# Patient Record
Sex: FEMALE | Race: WHITE
[De-identification: names, ages, dates, MRNs, and addresses within clinical notes are randomized per-mention and may not be internally consistent; named-entity substitution may affect disease eponyms.]

---

## 2017-04-30 ENCOUNTER — HOSPITAL ENCOUNTER (EMERGENCY)
Dept: HOSPITAL 10 - E/R | Age: 57
Discharge: HOME | End: 2017-04-30
Payer: MEDICAID

## 2017-04-30 VITALS — SYSTOLIC BLOOD PRESSURE: 132 MMHG | DIASTOLIC BLOOD PRESSURE: 71 MMHG | HEART RATE: 82 BPM | RESPIRATION RATE: 16 BRPM

## 2017-04-30 VITALS
HEIGHT: 62 IN | HEIGHT: 62 IN | WEIGHT: 160.94 LBS | WEIGHT: 160.94 LBS | BODY MASS INDEX: 29.62 KG/M2 | BODY MASS INDEX: 29.62 KG/M2

## 2017-04-30 VITALS — TEMPERATURE: 97.9 F

## 2017-04-30 DIAGNOSIS — K74.60: Primary | ICD-10-CM

## 2017-04-30 LAB
ADD SCAN DIFF: NO
ADD UMIC: NO
ALBUMIN SERPL-MCNC: 3.5 G/DL (ref 3.3–4.9)
ALBUMIN/GLOB SERPL: 0.94 {RATIO}
ALP SERPL-CCNC: 224 IU/L (ref 42–121)
ALT SERPL-CCNC: 32 IU/L (ref 13–69)
ANION GAP SERPL CALC-SCNC: 16 MMOL/L (ref 8–16)
APTT BLD: 31.5 SEC (ref 25–35)
AST SERPL-CCNC: 39 IU/L (ref 15–46)
BASOPHILS # BLD AUTO: 0 10^3/UL (ref 0–0.1)
BASOPHILS NFR BLD: 0.3 % (ref 0–2)
BILIRUB DIRECT SERPL-MCNC: 0 MG/DL (ref 0–0.2)
BILIRUB SERPL-MCNC: 0.7 MG/DL (ref 0.2–1.3)
BUN SERPL-MCNC: 16 MG/DL (ref 7–20)
CALCIUM SERPL-MCNC: 8.5 MG/DL (ref 8.4–10.2)
CHLORIDE SERPL-SCNC: 109 MMOL/L (ref 97–110)
CO2 SERPL-SCNC: 21 MMOL/L (ref 21–31)
COLOR UR: YELLOW
CREAT SERPL-MCNC: 0.53 MG/DL (ref 0.44–1)
EOSINOPHIL # BLD: 0.2 10^3/UL (ref 0–0.5)
EOSINOPHIL NFR BLD: 2.2 % (ref 0–7)
ERYTHROCYTE [DISTWIDTH] IN BLOOD BY AUTOMATED COUNT: 20.7 % (ref 11.5–14.5)
GLOBULIN SER-MCNC: 3.7 G/DL (ref 1.3–3.2)
GLUCOSE SERPL-MCNC: 148 MG/DL (ref 70–220)
GLUCOSE UR STRIP-MCNC: NEGATIVE %
HCT VFR BLD CALC: 33 % (ref 37–47)
HGB BLD-MCNC: 10.1 G/DL (ref 12–16)
INR PPP: 1.21
KETONES UR STRIP.AUTO-MCNC: (no result) MG/DL
LYMPHOCYTES # BLD AUTO: 1.3 10^3/UL (ref 0.8–2.9)
LYMPHOCYTES NFR BLD AUTO: 13 % (ref 15–51)
MCH RBC QN AUTO: 25.8 PG (ref 29–33)
MCHC RBC AUTO-ENTMCNC: 30.6 G/DL (ref 32–37)
MCV RBC AUTO: 84.4 FL (ref 82–101)
MONOCYTES # BLD: 0.6 10^3/UL (ref 0.3–0.9)
MONOCYTES NFR BLD: 5.5 % (ref 0–11)
NEUTROPHILS # BLD: 7.8 10^3/UL (ref 1.6–7.5)
NEUTROPHILS NFR BLD AUTO: 78.6 % (ref 39–77)
NITRITE UR QL STRIP.AUTO: NEGATIVE
NRBC # BLD MANUAL: 0 10^3/UL (ref 0–0)
NRBC BLD QL: 0 /100WBC (ref 0–0)
PLATELET # BLD: 124 10^3/UL (ref 140–415)
PMV BLD AUTO: 9.9 FL (ref 7.4–10.4)
POTASSIUM SERPL-SCNC: 3.3 MMOL/L (ref 3.5–5.1)
PROT SERPL-MCNC: 7.2 G/DL (ref 6.1–8.1)
PROTHROMBIN TIME: 15.4 SEC (ref 12.2–14.2)
PT RATIO: 1.2
RBC # BLD AUTO: 3.91 10^6/UL (ref 4.2–5.4)
RBC # UR AUTO: NEGATIVE /UL
SODIUM SERPL-SCNC: 143 MMOL/L (ref 135–144)
TROPONIN I SERPL-MCNC: < 0.012 NG/ML (ref 0–0.12)
URINE BILIRUBIN (DIP): NEGATIVE
URINE TOTAL PROTEIN (DIP): NEGATIVE
UROBILINOGEN UR STRIP-ACNC: (no result) (ref 0.1–1)
WBC # BLD AUTO: 9.9 10^3/UL (ref 4.8–10.8)
WBC # UR STRIP: NEGATIVE /UL

## 2017-04-30 PROCEDURE — 85025 COMPLETE CBC W/AUTO DIFF WBC: CPT

## 2017-04-30 PROCEDURE — 36415 COLL VENOUS BLD VENIPUNCTURE: CPT

## 2017-04-30 PROCEDURE — 96375 TX/PRO/DX INJ NEW DRUG ADDON: CPT

## 2017-04-30 PROCEDURE — 85730 THROMBOPLASTIN TIME PARTIAL: CPT

## 2017-04-30 PROCEDURE — 71010: CPT

## 2017-04-30 PROCEDURE — 81003 URINALYSIS AUTO W/O SCOPE: CPT

## 2017-04-30 PROCEDURE — 74176 CT ABD & PELVIS W/O CONTRAST: CPT

## 2017-04-30 PROCEDURE — 84484 ASSAY OF TROPONIN QUANT: CPT

## 2017-04-30 PROCEDURE — 93005 ELECTROCARDIOGRAM TRACING: CPT

## 2017-04-30 PROCEDURE — 85610 PROTHROMBIN TIME: CPT

## 2017-04-30 PROCEDURE — 80053 COMPREHEN METABOLIC PANEL: CPT

## 2017-04-30 PROCEDURE — 83690 ASSAY OF LIPASE: CPT

## 2017-04-30 PROCEDURE — 96374 THER/PROPH/DIAG INJ IV PUSH: CPT

## 2017-04-30 NOTE — RADRPT
PROCEDURE:   CT Abdomen and pelvis without contrast. 

 

CLINICAL INDICATION:   Abdominal pain. 

 

TECHNIQUE:    CT scan of the abdomen and pelvis was performed on a multi-detector high-resolution CT
 scanner.  Contiguous axial images were obtained from the lung bases to the ischial tuberosities wit
hout intravenous contrast.  Coronal and sagittal reformatted images were also obtained.  Images were
 reviewed on the PACS workstation.

 

One or more of the following dose reduction techniques were used:

- Automated exposure control.

- Adjustment of the mA and/or kV according to patient size.

- Use of iterative reconstruction technique.

 

Exam CTD/vol = 16.51 mGy.

Total exam DLP = 876.06 mGy-cm.   

 

COMPARISON:   None. 

 

FINDINGS:

Evaluation of the lung bases demonstrates no pleural or parenchymal disease.

 

Abdomen:  The liver is normal in size with a diffuse nodular contour consistent with cirrhosis.  The
re is no focal mass or dilatation of the biliary tree.  The patient is status post cholecystectomy. 
 The spleen, pancreas and bilateral adrenal glands are within normal limits.  Bilateral kidneys are 
normal in size with no contour deforming mass identified.  There is no radiopaque renal or ureteral 
calculus identified.  There is no hydronephrosis or hydroureter.  There is no retroperitoneal adenop
athy.  The abdominal aorta is of normal caliber with mild scattered atherosclerotic calcifications.

 

There is no abnormal bowel wall thickening or distension.  There is no bowel obstruction or free air
.  A normal appendix is identified.  There is no diverticulosis or diverticulitis.  There is no asci
bernabe.

 

Pelvis:  The bladder is unremarkable.  The uterus and adnexa are within normal limits.  There is tra
ce pelvic free fluid.  There is no significant pelvic adenopathy.

 

Evaluation of the osseous structures demonstrates no suspicious lytic or blastic lesion. There is in
crease sclerosis surrounding bilateral sacroiliac joints.

 

IMPRESSION:

Cirrhotic liver.

Status post cholecystectomy.

Mild vascular calcifications reflective of atherosclerosis.

Trace pelvic free fluid.

Bilateral sacroiliitis.

Otherwise no acute abnormality identified within the abdomen and pelvis.

_____________________________________________ 

.Howard Gilbert MD, MD           Date    Time 

Electronically viewed and signed by .Howard Gilbert MD, MD on 04/30/2017 03:33 

 

D:  04/30/2017 03:33  T:  04/30/2017 03:33

.T/

## 2017-04-30 NOTE — RADRPT
PROCEDURE:   XR Chest. 

 

CLINICAL INDICATION:  Abdominal pain.

 

TECHNIQUE:  Single frontal view of the chest.

 

COMPARISON:  Plain film chest dated 03/30/2014.

 

FINDINGS:

Cardiomegaly and atherosclerotic calcifications in the thoracic aorta.  Portable technique, patient 
body habitus and hypoinflated lungs accentuate pulmonary vascular markings. The lungs are otherwise 
clear. No signs of pleural fluid or pneumothorax are seen. The osseous structures and soft tissues a
re unremarkable. 

 

IMPRESSION:

No evidence for active cardiopulmonary disease. 

 

RPTAT: UU

_____________________________________________ 

Physician Nicola           Date    Time 

Electronically viewed and signed by Physician Nicola on 04/30/2017 03:22 

 

D:  04/30/2017 03:22  T:  04/30/2017 03:22

RS/

## 2017-04-30 NOTE — ERD
ER Documentation


Chief Complaint


Date/Time


DATE: 4/30/17 


TIME: 05:03


Chief Complaint


RUQ abd pain radaiting to back since 4 hours ago





HPI


This 56-year-old female presents for right upper quadrant pain radiating to her 

back for 4 hours.  She is also had nausea and vomiting.  The vomiting is 

nonbloody and nonbilious.  She has had no fevers and chills.  She gets this 

every now and then.  Is not related to food intake as far she knows.  She has 

had her gallbladder removed years ago.  She has no chest pain shortness of 

breath





ROS


All systems reviewed and are negative except as per history of present illness.





Medications


Home Meds


Active Scripts


Ranitidine Hcl* (Zantac*) 150 Mg Tablet, 150 MG PO BID Y for EPIGASTRIC PAIN, #

30 TAB


   Prov:CHARLEEN NUNN DO         4/30/17


Ondansetron (Zofran Odt) 4 Mg Tab.rapdis, 4 MG PO Q6, #20


   Prov:CHARLEEN NUNN DO         4/30/17


Naproxen* (Naproxen*) 500 Mg Tablet, 500 MG PO BID, #20 TAB


   Prov:CHARLEEN NUNN DO         4/30/17


Hydrocodone/Acetaminophen (Norco 5-325 Tablet) 1 Each Tablet, 1 EACH PO Q6, #14 

TAB


   Prov:CHARLEEN NUNN DO         4/30/17


Benzonatate* (Tessalon Perle*) 100 Mg Capsule, 100 MG PO TID, #30 CAP


   Prov:JOSHUA GALLEGO PA-C         3/28/17


Dextromethorphan Hb-Promethazine Hcl (Promethazine DM Syrup) 473 Ml Syrup, 5 ML 

PO Q6H Y for COUGH, #4 OZ


   Prov:JOSHUA GALLEGO PA-C         3/28/17


Ondansetron (Ondansetron Odt) 4 Mg Tab.rapdis, 4 MG PO Q6H Y for NAUSEA AND/OR 

VOMITING, #30 TAB


   Prov:NOBLE ALMAGUER MD         2/22/17


Hydrocodone/Acetaminophen (Norco  Tablet) 1 Each Tablet, 1 TAB PO Q6H Y 

for PAIN, #7 TAB


   Prov:NOBLE ALMAGUER MD         2/22/17


Nitrofurantoin Monohyd Macrocr* (Macrobid*) 100 Mg Capsr, 100 MG PO BID for 7 

Days, CAP


   Prov:NOBLE ALMAGUER MD         2/22/17





Allergies


Allergies:  


Coded Allergies:  


     No Known Allergy (Unverified , 12/5/13)





PMhx/Soc


History of Surgery:  Yes (Csection, gallbladder)


Anesthesia Reaction:  No


Hx Neurological Disorder:  No


Hx Respiratory Disorders:  No


Hx Cardiac Disorders:  No


Hx Psychiatric Problems:  No


Hx Miscellaneous Medical Probl:  Yes (depression)


Hx Alcohol Use:  No


Hx Substance Use:  No


Hx Tobacco Use:  No


Smoking Status:  Never smoker





Physical Exam


Vitals





Vital Signs








  Date Time  Temp Pulse Resp B/P Pulse Ox O2 Delivery O2 Flow Rate FiO2


 


4/30/17 01:42 97.9 86 21 128/84 100 Room Air  


 


4/30/17 00:29 98.5 102 20 165/80 100   








Physical Exam


Const:  []


Head:   Atraumatic 


Eyes:    Normal Conjunctiva


ENT:    Normal External Ears, Nose and Mouth.


Neck:               Full range of motion..~ No meningismus.


Resp:    Clear to auscultation bilaterally


Cardio:    Regular rate and rhythm, no murmurs


Abd:    Soft, non tender, non distended. Normal bowel sounds


Skin:    No petechiae or rashes


Back:    No midline or flank tenderness


Ext:    No cyanosis, or edema


Neur:    Awake and alert


Psych:    Normal Mood and Affect


Result Diagram:  


4/30/17 0150                                                                   

             4/30/17 0150





Results 24 hrs





 Laboratory Tests








Test


  4/30/17


01:50 4/30/17


04:17


 


White Blood Count 9.910^3/ul  


 


Red Blood Count 3.9110^6/ul  


 


Hemoglobin 10.1g/dl  


 


Hematocrit 33.0%  


 


Mean Corpuscular Volume 84.4fl  


 


Mean Corpuscular Hemoglobin 25.8pg  


 


Mean Corpuscular Hemoglobin


Concent 30.6g/dl 


  


 


 


Red Cell Distribution Width 20.7%  


 


Platelet Count 12494^3/UL  


 


Mean Platelet Volume 9.9fl  


 


Neutrophils % 78.6%  


 


Lymphocytes % 13.0%  


 


Monocytes % 5.5%  


 


Eosinophils % 2.2%  


 


Basophils % 0.3%  


 


Nucleated Red Blood Cells % 0.0/100WBC  


 


Neutrophils # 7.810^3/ul  


 


Lymphocytes # 1.310^3/ul  


 


Monocytes # 0.610^3/ul  


 


Eosinophils # 0.210^3/ul  


 


Basophils # 0.010^3/ul  


 


Nucleated Red Blood Cells # 0.010^3/ul  


 


Prothrombin Time 15.4Sec  


 


Prothrombin Time Ratio 1.2  


 


INR International Normalized


Ratio 1.21 


  


 


 


Activated Partial


Thromboplast Time 31.5Sec 


  


 


 


Sodium Level 143mmol/L  


 


Potassium Level 3.3mmol/L  


 


Chloride Level 109mmol/L  


 


Carbon Dioxide Level 21mmol/L  


 


Anion Gap 16  


 


Blood Urea Nitrogen 16mg/dl  


 


Creatinine 0.53mg/dl  


 


Glucose Level 148mg/dl  


 


Calcium Level 8.5mg/dl  


 


Total Bilirubin 0.7mg/dl  


 


Direct Bilirubin 0.00mg/dl  


 


Indirect Bilirubin 0.7mg/dl  


 


Aspartate Amino Transf


(AST/SGOT) 39IU/L 


  


 


 


Alanine Aminotransferase


(ALT/SGPT) 32IU/L 


  


 


 


Alkaline Phosphatase 224IU/L  


 


Troponin I < 0.012ng/ml  


 


Total Protein 7.2g/dl  


 


Albumin 3.5g/dl  


 


Globulin 3.70g/dl  


 


Albumin/Globulin Ratio 0.94  


 


Lipase 153U/L  


 


Urine Color  YELLOW 


 


Urine Clarity  CLEAR 


 


Urine pH  6.5 


 


Urine Specific Gravity  1.025 


 


Urine Ketones  TRACE 


 


Urine Nitrite  NEGATIVE 


 


Urine Bilirubin  NEGATIVE 


 


Urine Urobilinogen  0.2  E.U./dL 


 


Urine Leukocyte Esterase  NEGATIVE 


 


Urine Hemoglobin  NEGATIVE 


 


Urine Glucose  NEGATIVE% 


 


Urine Total Protein  NEGATIVE 








 Current Medications








 Medications


  (Trade)  Dose


 Ordered  Sig/Emily


 Route


 PRN Reason  Start Time


 Stop Time Status Last Admin


Dose Admin


 


 Morphine Sulfate


  (morphine)  4 mg  ONCE  STAT


 IV


   4/30/17 01:39


 4/30/17 01:42 DC 4/30/17 02:07


 


 


 Ondansetron HCl


  (Zofran Inj)  4 mg  ONCE  STAT


 IV


   4/30/17 01:39


 4/30/17 01:42 DC 4/30/17 02:07


 


 


 Famotidine


  (Pepcid Iv)  20 mg  ONCE  ONCE


 IV


   4/30/17 02:00


 4/30/17 02:01 DC 4/30/17 02:07


 











Procedures/MDM


Right upper quadrant pain with cirrhotic liver the patient did not know she 

had.  She has primary care follow-up but had never been told that she has liver 

problems.  She does not have any elevated liver enzymes enzymes.  Her pain was 

improved greatly in the emergency room with morphine and Pepcid.  Her nausea 

was completely resolved with Zofran.  She has no signs of infection.  No signs 

of cardiac ischemia are elevated troponin.  I am going to discharge her primary 

care follow-up as well as instructions to see a gastroenterologist for her 

liver cirrhosis as well as a possible EGD to rule out ulcer.  No free air.  

Return precautions to the ER.





CT abdomen pelvis interpretation: Cirrhotic liver, no obstruction, no free air, 

no fractures.


EKG interpretation: Normal sinus rhythm rate of 80, prolonged QT, normal axis, 

no ST or T-wave changes concerning for acute ischemia





Departure


Diagnosis:  


 Primary Impression:  


 Cirrhosis of liver


 Additional Impression:  


 Abdominal pain


Condition:  Stable


Patient Instructions:  Abdominal Pain, Cirrhosis of the Liver





Additional Instructions:  


Llame al doctor FE y andreea vandana RAMAN PARA DENTRO DE 1-2 JONES. Consigue un 

referral para un GASTROENTEROLOGIST for liver and possible EGD.  Dgale a la 

secretaria que nosotros le instruimos hacer esta raman.Avise o llame si bunch 

condicin se empeora antes de la raman. Regresa aqui si peor o no mejor.











CHARLEEN NUNN DO Apr 30, 2017 05:06

## 2018-06-01 ENCOUNTER — HOSPITAL ENCOUNTER (INPATIENT)
Dept: HOSPITAL 91 - E/R | Age: 58
LOS: 4 days | Discharge: HOME | DRG: 444 | End: 2018-06-05
Payer: MEDICAID

## 2018-06-01 DIAGNOSIS — K29.70: ICD-10-CM

## 2018-06-01 DIAGNOSIS — Z90.49: ICD-10-CM

## 2018-06-01 DIAGNOSIS — F41.9: ICD-10-CM

## 2018-06-01 DIAGNOSIS — K74.60: ICD-10-CM

## 2018-06-01 DIAGNOSIS — K80.50: Primary | ICD-10-CM

## 2018-06-01 DIAGNOSIS — R17: ICD-10-CM

## 2018-06-01 DIAGNOSIS — K70.30: ICD-10-CM

## 2018-06-01 DIAGNOSIS — K83.1: ICD-10-CM

## 2018-06-01 LAB
ABNORMAL IP MESSAGE: 1
ADD MAN DIFF?: NO
ALANINE AMINOTRANSFERASE: 27 IU/L (ref 13–69)
ALBUMIN/GLOBULIN RATIO: 0.97
ALBUMIN: 3.7 G/DL (ref 3.3–4.9)
ALKALINE PHOSPHATASE: 223 IU/L (ref 42–121)
ANION GAP: 14 (ref 8–16)
ASPARTATE AMINO TRANSFERASE: 51 IU/L (ref 15–46)
BASOPHIL #: 0 10^3/UL (ref 0–0.1)
BASOPHILS %: 0.6 % (ref 0–2)
BILIRUBIN,DIRECT: 0 MG/DL (ref 0–0.2)
BILIRUBIN,TOTAL: 1.6 MG/DL (ref 0.2–1.3)
BLOOD UREA NITROGEN: 4 MG/DL (ref 7–20)
CALCIUM: 8.9 MG/DL (ref 8.4–10.2)
CARBON DIOXIDE: 26 MMOL/L (ref 21–31)
CHLORIDE: 111 MMOL/L (ref 97–110)
CREATININE: 0.5 MG/DL (ref 0.44–1)
EOSINOPHILS #: 0.4 10^3/UL (ref 0–0.5)
EOSINOPHILS %: 6.4 % (ref 0–7)
GLOBULIN: 3.8 G/DL (ref 1.3–3.2)
GLUCOSE: 110 MG/DL (ref 70–220)
HEMATOCRIT: 39.7 % (ref 37–47)
HEMOGLOBIN: 13.7 G/DL (ref 12–16)
INR: 1.15
LIPASE: 132 U/L (ref 23–300)
LYMPHOCYTES #: 2.1 10^3/UL (ref 0.8–2.9)
LYMPHOCYTES %: 37.9 % (ref 15–51)
MEAN CORPUSCULAR HEMOGLOBIN: 33 PG (ref 29–33)
MEAN CORPUSCULAR HGB CONC: 34.5 G/DL (ref 32–37)
MEAN CORPUSCULAR VOLUME: 95.7 FL (ref 82–101)
MEAN PLATELET VOLUME: 11.8 FL (ref 7.4–10.4)
MONOCYTE #: 0.6 10^3/UL (ref 0.3–0.9)
MONOCYTES %: 11 % (ref 0–11)
NEUTROPHIL #: 2.4 10^3/UL (ref 1.6–7.5)
NEUTROPHILS %: 43.9 % (ref 39–77)
NUCLEATED RED BLOOD CELLS #: 0 10^3/UL (ref 0–0)
NUCLEATED RED BLOOD CELLS%: 0 /100WBC (ref 0–0)
PARTIAL THROMBOPLASTIN TIME: 40.8 SEC (ref 25–35)
PLATELET COUNT: 72 10^3/UL (ref 140–415)
POSITIVE DIFF: (no result)
POTASSIUM: 3.9 MMOL/L (ref 3.5–5.1)
PROTIME: 14.9 SEC (ref 11.9–14.9)
PT RATIO: 1.2
RED BLOOD COUNT: 4.15 10^6/UL (ref 4.2–5.4)
RED CELL DISTRIBUTION WIDTH: 13.9 % (ref 11.5–14.5)
SODIUM: 147 MMOL/L (ref 135–144)
TOTAL PROTEIN: 7.5 G/DL (ref 6.1–8.1)
TROPONIN-I: < 0.012 NG/ML (ref 0–0.12)
WHITE BLOOD COUNT: 5.4 10^3/UL (ref 4.8–10.8)

## 2018-06-01 PROCEDURE — 74181 MRI ABDOMEN W/O CONTRAST: CPT

## 2018-06-01 PROCEDURE — 88312 SPECIAL STAINS GROUP 1: CPT

## 2018-06-01 PROCEDURE — 86301 IMMUNOASSAY TUMOR CA 19-9: CPT

## 2018-06-01 PROCEDURE — 88305 TISSUE EXAM BY PATHOLOGIST: CPT

## 2018-06-01 PROCEDURE — 96375 TX/PRO/DX INJ NEW DRUG ADDON: CPT

## 2018-06-01 PROCEDURE — 85730 THROMBOPLASTIN TIME PARTIAL: CPT

## 2018-06-01 PROCEDURE — 88104 CYTOPATH FL NONGYN SMEARS: CPT

## 2018-06-01 PROCEDURE — 83036 HEMOGLOBIN GLYCOSYLATED A1C: CPT

## 2018-06-01 PROCEDURE — 83735 ASSAY OF MAGNESIUM: CPT

## 2018-06-01 PROCEDURE — 80053 COMPREHEN METABOLIC PANEL: CPT

## 2018-06-01 PROCEDURE — 99285 EMERGENCY DEPT VISIT HI MDM: CPT

## 2018-06-01 PROCEDURE — 74330 X-RAY BILE/PANC ENDOSCOPY: CPT

## 2018-06-01 PROCEDURE — 84484 ASSAY OF TROPONIN QUANT: CPT

## 2018-06-01 PROCEDURE — 84443 ASSAY THYROID STIM HORMONE: CPT

## 2018-06-01 PROCEDURE — 96374 THER/PROPH/DIAG INJ IV PUSH: CPT

## 2018-06-01 PROCEDURE — 36415 COLL VENOUS BLD VENIPUNCTURE: CPT

## 2018-06-01 PROCEDURE — 74176 CT ABD & PELVIS W/O CONTRAST: CPT

## 2018-06-01 PROCEDURE — 83690 ASSAY OF LIPASE: CPT

## 2018-06-01 PROCEDURE — 93005 ELECTROCARDIOGRAM TRACING: CPT

## 2018-06-01 PROCEDURE — 80061 LIPID PANEL: CPT

## 2018-06-01 PROCEDURE — 85610 PROTHROMBIN TIME: CPT

## 2018-06-01 PROCEDURE — 85025 COMPLETE CBC W/AUTO DIFF WBC: CPT

## 2018-06-01 RX ADMIN — ONDANSETRON HYDROCHLORIDE 1 MG: 2 INJECTION, SOLUTION INTRAMUSCULAR; INTRAVENOUS at 02:41

## 2018-06-01 RX ADMIN — THIAMINE HYDROCHLORIDE 1 MLS/HR: 100 INJECTION, SOLUTION INTRAMUSCULAR; INTRAVENOUS at 02:40

## 2018-06-01 RX ADMIN — FAMOTIDINE 1 MG: 10 INJECTION, SOLUTION INTRAVENOUS at 02:46

## 2018-06-01 RX ADMIN — THIAMINE HYDROCHLORIDE 1 MLS/HR: 100 INJECTION, SOLUTION INTRAMUSCULAR; INTRAVENOUS at 04:54

## 2018-06-01 RX ADMIN — ALUMINUM HYDROXIDE, MAGNESIUM HYDROXIDE, DIMETHICONE 1 ML: 200; 200; 20 SUSPENSION ORAL at 02:46

## 2018-06-01 RX ADMIN — HYDROMORPHONE HYDROCHLORIDE 1 MG: 1 INJECTION, SOLUTION INTRAMUSCULAR; INTRAVENOUS; SUBCUTANEOUS at 06:09

## 2018-06-01 RX ADMIN — HYDROMORPHONE HYDROCHLORIDE 1 MG: 1 INJECTION, SOLUTION INTRAMUSCULAR; INTRAVENOUS; SUBCUTANEOUS at 13:58

## 2018-06-02 LAB
ABNORMAL IP MESSAGE: 1
ADD MAN DIFF?: NO
ALANINE AMINOTRANSFERASE: 31 IU/L (ref 13–69)
ALBUMIN/GLOBULIN RATIO: 0.9
ALBUMIN: 2.8 G/DL (ref 3.3–4.9)
ALKALINE PHOSPHATASE: 136 IU/L (ref 42–121)
ANION GAP: 10 (ref 8–16)
ASPARTATE AMINO TRANSFERASE: 51 IU/L (ref 15–46)
BASOPHIL #: 0 10^3/UL (ref 0–0.1)
BASOPHILS %: 0.7 % (ref 0–2)
BILIRUBIN,DIRECT: 0 MG/DL (ref 0–0.2)
BILIRUBIN,TOTAL: 1.6 MG/DL (ref 0.2–1.3)
BLOOD UREA NITROGEN: 10 MG/DL (ref 7–20)
CALCIUM: 8.2 MG/DL (ref 8.4–10.2)
CARBON DIOXIDE: 28 MMOL/L (ref 21–31)
CHLORIDE: 111 MMOL/L (ref 97–110)
CHOL/HDL RATIO: 2.5 RATIO
CHOLESTEROL: 93 MG/DL (ref 100–200)
CREATININE: 0.52 MG/DL (ref 0.44–1)
EOSINOPHILS #: 0.2 10^3/UL (ref 0–0.5)
EOSINOPHILS %: 5.5 % (ref 0–7)
GLOBULIN: 3.1 G/DL (ref 1.3–3.2)
GLUCOSE: 102 MG/DL (ref 70–220)
HDL CHOLESTEROL: 37 MG/DL (ref 37–92)
HEMATOCRIT: 34.5 % (ref 37–47)
HEMOGLOBIN A1C: 5.2 % (ref 0–5.9)
HEMOGLOBIN: 11.9 G/DL (ref 12–16)
LDL CHOLESTEROL,CALCULATED: 40 MG/DL
LYMPHOCYTES #: 1.6 10^3/UL (ref 0.8–2.9)
LYMPHOCYTES %: 38.5 % (ref 15–51)
MAGNESIUM: 2 MG/DL (ref 1.7–2.5)
MEAN CORPUSCULAR HEMOGLOBIN: 33 PG (ref 29–33)
MEAN CORPUSCULAR HGB CONC: 34.5 G/DL (ref 32–37)
MEAN CORPUSCULAR VOLUME: 95.6 FL (ref 82–101)
MEAN PLATELET VOLUME: 11.2 FL (ref 7.4–10.4)
MONOCYTE #: 0.5 10^3/UL (ref 0.3–0.9)
MONOCYTES %: 11.9 % (ref 0–11)
NEUTROPHIL #: 1.7 10^3/UL (ref 1.6–7.5)
NEUTROPHILS %: 42.9 % (ref 39–77)
NUCLEATED RED BLOOD CELLS #: 0 10^3/UL (ref 0–0)
NUCLEATED RED BLOOD CELLS%: 0 /100WBC (ref 0–0)
PLATELET COUNT: 64 10^3/UL (ref 140–415)
POSITIVE DIFF: (no result)
POTASSIUM: 4.2 MMOL/L (ref 3.5–5.1)
RED BLOOD COUNT: 3.61 10^6/UL (ref 4.2–5.4)
RED CELL DISTRIBUTION WIDTH: 14.3 % (ref 11.5–14.5)
SODIUM: 145 MMOL/L (ref 135–144)
THYROID STIMULATING HORMONE: 0.61 MIU/L (ref 0.47–4.68)
TOTAL PROTEIN: 5.9 G/DL (ref 6.1–8.1)
TRIGLYCERIDES: 80 MG/DL (ref 0–149)
WHITE BLOOD COUNT: 4 10^3/UL (ref 4.8–10.8)

## 2018-06-02 PROCEDURE — 0DJ68ZZ INSPECTION OF STOMACH, VIA NATURAL OR ARTIFICIAL OPENING ENDOSCOPIC: ICD-10-PCS

## 2018-06-02 PROCEDURE — 0FC98ZZ EXTIRPATION OF MATTER FROM COMMON BILE DUCT, VIA NATURAL OR ARTIFICIAL OPENING ENDOSCOPIC: ICD-10-PCS

## 2018-06-02 PROCEDURE — 0F798DZ DILATION OF COMMON BILE DUCT WITH INTRALUMINAL DEVICE, VIA NATURAL OR ARTIFICIAL OPENING ENDOSCOPIC: ICD-10-PCS

## 2018-06-03 RX ADMIN — PANTOPRAZOLE SODIUM 1 MG: 40 TABLET, DELAYED RELEASE ORAL at 12:43

## 2018-06-04 LAB
ABNORMAL IP MESSAGE: 1
ADD MAN DIFF?: NO
ALANINE AMINOTRANSFERASE: 37 IU/L (ref 13–69)
ALBUMIN/GLOBULIN RATIO: 0.9
ALBUMIN: 2.8 G/DL (ref 3.3–4.9)
ALKALINE PHOSPHATASE: 131 IU/L (ref 42–121)
ANION GAP: 10 (ref 8–16)
ASPARTATE AMINO TRANSFERASE: 38 IU/L (ref 15–46)
BASOPHIL #: 0 10^3/UL (ref 0–0.1)
BASOPHILS %: 0.5 % (ref 0–2)
BILIRUBIN,DIRECT: 0 MG/DL (ref 0–0.2)
BILIRUBIN,TOTAL: 0.8 MG/DL (ref 0.2–1.3)
BLOOD UREA NITROGEN: 8 MG/DL (ref 7–20)
CALCIUM: 8.3 MG/DL (ref 8.4–10.2)
CANCER ANTIGEN 19-9: 43.5 U/ML (ref 0–37)
CARBON DIOXIDE: 26 MMOL/L (ref 21–31)
CHLORIDE: 113 MMOL/L (ref 97–110)
CREATININE: 0.49 MG/DL (ref 0.44–1)
EOSINOPHILS #: 0.1 10^3/UL (ref 0–0.5)
EOSINOPHILS %: 2.1 % (ref 0–7)
GLOBULIN: 3.1 G/DL (ref 1.3–3.2)
GLUCOSE: 176 MG/DL (ref 70–220)
HEMATOCRIT: 36.6 % (ref 37–47)
HEMOGLOBIN: 12.7 G/DL (ref 12–16)
LYMPHOCYTES #: 2 10^3/UL (ref 0.8–2.9)
LYMPHOCYTES %: 30.1 % (ref 15–51)
MEAN CORPUSCULAR HEMOGLOBIN: 33.5 PG (ref 29–33)
MEAN CORPUSCULAR HGB CONC: 34.7 G/DL (ref 32–37)
MEAN CORPUSCULAR VOLUME: 96.6 FL (ref 82–101)
MEAN PLATELET VOLUME: 11.5 FL (ref 7.4–10.4)
MONOCYTE #: 0.8 10^3/UL (ref 0.3–0.9)
MONOCYTES %: 11.8 % (ref 0–11)
NEUTROPHIL #: 3.6 10^3/UL (ref 1.6–7.5)
NEUTROPHILS %: 55 % (ref 39–77)
NUCLEATED RED BLOOD CELLS #: 0 10^3/UL (ref 0–0)
NUCLEATED RED BLOOD CELLS%: 0 /100WBC (ref 0–0)
PLATELET COUNT: 70 10^3/UL (ref 140–415)
POSITIVE DIFF: (no result)
POTASSIUM: 3.9 MMOL/L (ref 3.5–5.1)
RED BLOOD COUNT: 3.79 10^6/UL (ref 4.2–5.4)
RED CELL DISTRIBUTION WIDTH: 14.5 % (ref 11.5–14.5)
SODIUM: 145 MMOL/L (ref 135–144)
TOTAL PROTEIN: 5.9 G/DL (ref 6.1–8.1)
WHITE BLOOD COUNT: 6.6 10^3/UL (ref 4.8–10.8)

## 2018-06-04 RX ADMIN — HYDROMORPHONE HYDROCHLORIDE 1 MG: 1 INJECTION, SOLUTION INTRAMUSCULAR; INTRAVENOUS; SUBCUTANEOUS at 19:43

## 2018-06-04 RX ADMIN — PANTOPRAZOLE SODIUM 1 MG: 40 TABLET, DELAYED RELEASE ORAL at 05:33

## 2018-06-05 RX ADMIN — PANTOPRAZOLE SODIUM 1 MG: 40 TABLET, DELAYED RELEASE ORAL at 05:29

## 2018-08-08 ENCOUNTER — HOSPITAL ENCOUNTER (INPATIENT)
Dept: HOSPITAL 91 - E/R | Age: 58
LOS: 3 days | Discharge: HOME | DRG: 919 | End: 2018-08-11
Payer: MEDICAID

## 2018-08-08 ENCOUNTER — HOSPITAL ENCOUNTER (INPATIENT)
Age: 58
LOS: 3 days | Discharge: HOME | DRG: 919 | End: 2018-08-11

## 2018-08-08 DIAGNOSIS — E86.0: ICD-10-CM

## 2018-08-08 DIAGNOSIS — K83.1: ICD-10-CM

## 2018-08-08 DIAGNOSIS — T85.590A: Primary | ICD-10-CM

## 2018-08-08 DIAGNOSIS — R74.0: ICD-10-CM

## 2018-08-08 DIAGNOSIS — Z90.49: ICD-10-CM

## 2018-08-08 DIAGNOSIS — F41.9: ICD-10-CM

## 2018-08-08 DIAGNOSIS — D69.6: ICD-10-CM

## 2018-08-08 DIAGNOSIS — K70.30: ICD-10-CM

## 2018-08-08 DIAGNOSIS — K83.0: ICD-10-CM

## 2018-08-08 DIAGNOSIS — E66.9: ICD-10-CM

## 2018-08-08 DIAGNOSIS — F32.9: ICD-10-CM

## 2018-08-08 LAB
ABNORMAL IP MESSAGE: 1
ADD MAN DIFF?: NO
ALANINE AMINOTRANSFERASE: 30 IU/L (ref 13–69)
ALBUMIN/GLOBULIN RATIO: 0.91
ALBUMIN: 3.2 G/DL (ref 3.3–4.9)
ALKALINE PHOSPHATASE: 173 IU/L (ref 42–121)
ANION GAP: 11 (ref 8–16)
ANISOCYTOSIS: (no result) (ref 0–0)
ASPARTATE AMINO TRANSFERASE: 60 IU/L (ref 15–46)
BASOPHIL #: 0 10^3/UL (ref 0–0.1)
BASOPHILS %: 0.6 % (ref 0–2)
BILIRUBIN,DIRECT: 0.6 MG/DL (ref 0–0.2)
BILIRUBIN,TOTAL: 3.4 MG/DL (ref 0.2–1.3)
BLOOD UREA NITROGEN: 8 MG/DL (ref 7–20)
CALCIUM: 8.1 MG/DL (ref 8.4–10.2)
CARBON DIOXIDE: 21 MMOL/L (ref 21–31)
CHLORIDE: 108 MMOL/L (ref 97–110)
CREATININE: 0.53 MG/DL (ref 0.44–1)
EOSINOPHILS #: 0.1 10^3/UL (ref 0–0.5)
EOSINOPHILS % (M): 2 % (ref 0–7)
EOSINOPHILS %: 2.7 % (ref 0–7)
GIANT THROMBO% (M): 1 % (ref 0–0)
GLOBULIN: 3.5 G/DL (ref 1.3–3.2)
GLUCOSE: 123 MG/DL (ref 70–220)
HEMATOCRIT: 37.8 % (ref 37–47)
HEMOGLOBIN: 13.2 G/DL (ref 12–16)
INR: 1.46
LIPASE: 39 U/L (ref 23–300)
LYMPHOCYTES #: 1 10^3/UL (ref 0.8–2.9)
LYMPHOCYTES #M: 0.6 10^3/UL (ref 0.8–2.9)
LYMPHOCYTES % (M): 12 % (ref 15–51)
LYMPHOCYTES %: 19.4 % (ref 15–51)
MACROCYTOSIS: (no result) (ref 0–0)
MEAN CORPUSCULAR HEMOGLOBIN: 33.5 PG (ref 29–33)
MEAN CORPUSCULAR HGB CONC: 34.9 G/DL (ref 32–37)
MEAN CORPUSCULAR VOLUME: 95.9 FL (ref 82–101)
MEAN PLATELET VOLUME: 11.2 FL (ref 7.4–10.4)
MONOCYTE #: 0.7 10^3/UL (ref 0.3–0.9)
MONOCYTE #M: 0.4 10^3/UL (ref 0.3–0.9)
MONOCYTES % (M): 9 % (ref 0–11)
MONOCYTES %: 12.5 % (ref 0–11)
NEUTROPHIL #: 3.4 10^3/UL (ref 1.6–7.5)
NEUTROPHILS %: 64.2 % (ref 39–77)
NUCLEATED RED BLOOD CELLS #: 0 10^3/UL (ref 0–0)
NUCLEATED RED BLOOD CELLS%: 0 /100WBC (ref 0–0)
PARTIAL THROMBOPLASTIN TIME: 41.3 SEC (ref 25–35)
PLATELET COUNT: 59 10^3/UL (ref 140–415)
PLATELET ESTIMATE: (no result)
POIKILOCYTOSIS: (no result) (ref 0–0)
POSITIVE DIFF: (no result)
POTASSIUM: 4 MMOL/L (ref 3.5–5.1)
PROTIME: 18 SEC (ref 11.9–14.9)
PT RATIO: 1.4
RED BLOOD COUNT: 3.94 10^6/UL (ref 4.2–5.4)
RED CELL DISTRIBUTION WIDTH: 13.9 % (ref 11.5–14.5)
SEGMENTED NEUTROPHILS (M) %: 77 % (ref 39–77)
SMUDGE%M: 16 % (ref 0–0)
SODIUM: 136 MMOL/L (ref 135–144)
TOTAL PROTEIN: 6.7 G/DL (ref 6.1–8.1)
WHITE BLOOD COUNT: 5.3 10^3/UL (ref 4.8–10.8)

## 2018-08-08 PROCEDURE — 82105 ALPHA-FETOPROTEIN SERUM: CPT

## 2018-08-08 PROCEDURE — 81001 URINALYSIS AUTO W/SCOPE: CPT

## 2018-08-08 PROCEDURE — 87086 URINE CULTURE/COLONY COUNT: CPT

## 2018-08-08 PROCEDURE — 82378 CARCINOEMBRYONIC ANTIGEN: CPT

## 2018-08-08 PROCEDURE — 87040 BLOOD CULTURE FOR BACTERIA: CPT

## 2018-08-08 PROCEDURE — 74330 X-RAY BILE/PANC ENDOSCOPY: CPT

## 2018-08-08 PROCEDURE — 83036 HEMOGLOBIN GLYCOSYLATED A1C: CPT

## 2018-08-08 PROCEDURE — 74181 MRI ABDOMEN W/O CONTRAST: CPT

## 2018-08-08 PROCEDURE — 80053 COMPREHEN METABOLIC PANEL: CPT

## 2018-08-08 PROCEDURE — 96375 TX/PRO/DX INJ NEW DRUG ADDON: CPT

## 2018-08-08 PROCEDURE — 83735 ASSAY OF MAGNESIUM: CPT

## 2018-08-08 PROCEDURE — 82140 ASSAY OF AMMONIA: CPT

## 2018-08-08 PROCEDURE — 76705 ECHO EXAM OF ABDOMEN: CPT

## 2018-08-08 PROCEDURE — 85730 THROMBOPLASTIN TIME PARTIAL: CPT

## 2018-08-08 PROCEDURE — 84100 ASSAY OF PHOSPHORUS: CPT

## 2018-08-08 PROCEDURE — 96374 THER/PROPH/DIAG INJ IV PUSH: CPT

## 2018-08-08 PROCEDURE — 74176 CT ABD & PELVIS W/O CONTRAST: CPT

## 2018-08-08 PROCEDURE — 85025 COMPLETE CBC W/AUTO DIFF WBC: CPT

## 2018-08-08 PROCEDURE — 85610 PROTHROMBIN TIME: CPT

## 2018-08-08 PROCEDURE — 83690 ASSAY OF LIPASE: CPT

## 2018-08-08 PROCEDURE — 99285 EMERGENCY DEPT VISIT HI MDM: CPT

## 2018-08-08 PROCEDURE — 86301 IMMUNOASSAY TUMOR CA 19-9: CPT

## 2018-08-08 PROCEDURE — 36415 COLL VENOUS BLD VENIPUNCTURE: CPT

## 2018-08-08 RX ADMIN — HYDROMORPHONE HYDROCHLORIDE 1 MG: 1 INJECTION, SOLUTION INTRAMUSCULAR; INTRAVENOUS; SUBCUTANEOUS at 18:13

## 2018-08-08 RX ADMIN — THIAMINE HYDROCHLORIDE 1 MLS/HR: 100 INJECTION, SOLUTION INTRAMUSCULAR; INTRAVENOUS at 22:16

## 2018-08-08 RX ADMIN — THIAMINE HYDROCHLORIDE 1 MLS/HR: 100 INJECTION, SOLUTION INTRAMUSCULAR; INTRAVENOUS at 18:13

## 2018-08-08 RX ADMIN — HYDROMORPHONE HYDROCHLORIDE 1 MG: 1 INJECTION, SOLUTION INTRAMUSCULAR; INTRAVENOUS; SUBCUTANEOUS at 21:07

## 2018-08-08 RX ADMIN — ONDANSETRON HYDROCHLORIDE 1 MG: 2 INJECTION, SOLUTION INTRAMUSCULAR; INTRAVENOUS at 21:07

## 2018-08-08 RX ADMIN — ONDANSETRON HYDROCHLORIDE 1 MG: 2 INJECTION, SOLUTION INTRAMUSCULAR; INTRAVENOUS at 18:13

## 2018-08-09 LAB
ABNORMAL IP MESSAGE: 1
ADD MAN DIFF?: NO
ALANINE AMINOTRANSFERASE: 28 IU/L (ref 13–69)
ALBUMIN/GLOBULIN RATIO: 0.84
ALBUMIN: 2.7 G/DL (ref 3.3–4.9)
ALKALINE PHOSPHATASE: 154 IU/L (ref 42–121)
ANION GAP: 7 (ref 8–16)
ASPARTATE AMINO TRANSFERASE: 55 IU/L (ref 15–46)
BASOPHIL #: 0 10^3/UL (ref 0–0.1)
BASOPHILS %: 0.4 % (ref 0–2)
BILIRUBIN,DIRECT: 1.9 MG/DL (ref 0–0.2)
BILIRUBIN,TOTAL: 4.3 MG/DL (ref 0.2–1.3)
BLOOD UREA NITROGEN: 7 MG/DL (ref 7–20)
CALCIUM: 7.7 MG/DL (ref 8.4–10.2)
CARBON DIOXIDE: 25 MMOL/L (ref 21–31)
CHLORIDE: 110 MMOL/L (ref 97–110)
CREATININE: 0.53 MG/DL (ref 0.44–1)
EOSINOPHILS #: 0.3 10^3/UL (ref 0–0.5)
EOSINOPHILS %: 6.4 % (ref 0–7)
GLOBULIN: 3.2 G/DL (ref 1.3–3.2)
GLUCOSE: 97 MG/DL (ref 70–220)
HEMATOCRIT: 37.2 % (ref 37–47)
HEMOGLOBIN A1C: 5.2 % (ref 0–5.9)
HEMOGLOBIN: 12.6 G/DL (ref 12–16)
INR: 1.62
LYMPHOCYTES #: 1 10^3/UL (ref 0.8–2.9)
LYMPHOCYTES %: 19.9 % (ref 15–51)
MEAN CORPUSCULAR HEMOGLOBIN: 33.4 PG (ref 29–33)
MEAN CORPUSCULAR HGB CONC: 33.9 G/DL (ref 32–37)
MEAN CORPUSCULAR VOLUME: 98.7 FL (ref 82–101)
MEAN PLATELET VOLUME: 11.7 FL (ref 7.4–10.4)
MONOCYTE #: 0.5 10^3/UL (ref 0.3–0.9)
MONOCYTES %: 9.7 % (ref 0–11)
NEUTROPHIL #: 3.2 10^3/UL (ref 1.6–7.5)
NEUTROPHILS %: 63.2 % (ref 39–77)
NUCLEATED RED BLOOD CELLS #: 0 10^3/UL (ref 0–0)
NUCLEATED RED BLOOD CELLS%: 0 /100WBC (ref 0–0)
PARTIAL THROMBOPLASTIN TIME: 42.5 SEC (ref 25–35)
PLATELET COUNT: 53 10^3/UL (ref 140–415)
POSITIVE DIFF: (no result)
POTASSIUM: 4.3 MMOL/L (ref 3.5–5.1)
PROTIME: 19.6 SEC (ref 11.9–14.9)
PT RATIO: 1.5
RED BLOOD COUNT: 3.77 10^6/UL (ref 4.2–5.4)
RED CELL DISTRIBUTION WIDTH: 14.2 % (ref 11.5–14.5)
SODIUM: 138 MMOL/L (ref 135–144)
TOTAL PROTEIN: 5.9 G/DL (ref 6.1–8.1)
WHITE BLOOD COUNT: 5.1 10^3/UL (ref 4.8–10.8)

## 2018-08-09 PROCEDURE — 0F798DZ DILATION OF COMMON BILE DUCT WITH INTRALUMINAL DEVICE, VIA NATURAL OR ARTIFICIAL OPENING ENDOSCOPIC: ICD-10-PCS

## 2018-08-09 PROCEDURE — 0FPB8DZ REMOVAL OF INTRALUMINAL DEVICE FROM HEPATOBILIARY DUCT, VIA NATURAL OR ARTIFICIAL OPENING ENDOSCOPIC: ICD-10-PCS

## 2018-08-09 RX ADMIN — HYDROMORPHONE HYDROCHLORIDE 1 MG: 1 INJECTION, SOLUTION INTRAMUSCULAR; INTRAVENOUS; SUBCUTANEOUS at 08:18

## 2018-08-09 RX ADMIN — THIAMINE HYDROCHLORIDE 1 MLS/HR: 100 INJECTION, SOLUTION INTRAMUSCULAR; INTRAVENOUS at 13:58

## 2018-08-09 RX ADMIN — FLUOXETINE 1 MG: 20 CAPSULE ORAL at 08:21

## 2018-08-09 RX ADMIN — PIPERACILLIN AND TAZOBACTAM 1 MLS/HR: 2; .25 INJECTION, POWDER, FOR SOLUTION INTRAVENOUS at 14:55

## 2018-08-09 RX ADMIN — THIAMINE HYDROCHLORIDE 1 MLS/HR: 100 INJECTION, SOLUTION INTRAMUSCULAR; INTRAVENOUS at 14:55

## 2018-08-09 RX ADMIN — FENTANYL CITRATE 1 MCG: 50 INJECTION, SOLUTION INTRAMUSCULAR; INTRAVENOUS at 19:36

## 2018-08-09 RX ADMIN — THIAMINE HYDROCHLORIDE 1 MLS/HR: 100 INJECTION, SOLUTION INTRAMUSCULAR; INTRAVENOUS at 22:39

## 2018-08-09 RX ADMIN — PIPERACILLIN AND TAZOBACTAM 1 MLS/HR: 2; .25 INJECTION, POWDER, FOR SOLUTION INTRAVENOUS at 22:39

## 2018-08-09 RX ADMIN — PIPERACILLIN AND TAZOBACTAM 1 MLS/HR: 2; .25 INJECTION, POWDER, FOR SOLUTION INTRAVENOUS at 09:23

## 2018-08-09 RX ADMIN — PANTOPRAZOLE SODIUM 1 MG: 40 TABLET, DELAYED RELEASE ORAL at 06:00

## 2018-08-10 LAB
ABNORMAL IP MESSAGE: 1
ADD MAN DIFF?: NO
ADD UMIC: YES
ALANINE AMINOTRANSFERASE: 34 IU/L (ref 13–69)
ALBUMIN/GLOBULIN RATIO: 0.79
ALBUMIN: 2.3 G/DL (ref 3.3–4.9)
ALKALINE PHOSPHATASE: 127 IU/L (ref 42–121)
ANION GAP: 11 (ref 8–16)
ASPARTATE AMINO TRANSFERASE: 44 IU/L (ref 15–46)
BASOPHIL #: 0 10^3/UL (ref 0–0.1)
BASOPHILS %: 0.4 % (ref 0–2)
BILIRUBIN,DIRECT: 0.6 MG/DL (ref 0–0.2)
BILIRUBIN,TOTAL: 1.8 MG/DL (ref 0.2–1.3)
BLOOD UREA NITROGEN: 12 MG/DL (ref 7–20)
CALCIUM: 7.3 MG/DL (ref 8.4–10.2)
CARBON DIOXIDE: 20 MMOL/L (ref 21–31)
CHLORIDE: 112 MMOL/L (ref 97–110)
CREATININE: 0.5 MG/DL (ref 0.44–1)
EOSINOPHILS #: 0 10^3/UL (ref 0–0.5)
EOSINOPHILS %: 0 % (ref 0–7)
GLOBULIN: 2.9 G/DL (ref 1.3–3.2)
GLUCOSE: 187 MG/DL (ref 70–220)
HEMATOCRIT: 33.5 % (ref 37–47)
HEMOGLOBIN: 11.4 G/DL (ref 12–16)
LYMPHOCYTES #: 0.6 10^3/UL (ref 0.8–2.9)
LYMPHOCYTES %: 22.9 % (ref 15–51)
MAGNESIUM: 2.1 MG/DL (ref 1.7–2.5)
MEAN CORPUSCULAR HEMOGLOBIN: 33.3 PG (ref 29–33)
MEAN CORPUSCULAR HGB CONC: 34 G/DL (ref 32–37)
MEAN CORPUSCULAR VOLUME: 98 FL (ref 82–101)
MEAN PLATELET VOLUME: 12 FL (ref 7.4–10.4)
MONOCYTE #: 0.1 10^3/UL (ref 0.3–0.9)
MONOCYTES %: 5 % (ref 0–11)
NEUTROPHIL #: 1.9 10^3/UL (ref 1.6–7.5)
NEUTROPHILS %: 71.3 % (ref 39–77)
NUCLEATED RED BLOOD CELLS #: 0 10^3/UL (ref 0–0)
NUCLEATED RED BLOOD CELLS%: 0 /100WBC (ref 0–0)
PHOSPHORUS: 1.4 MG/DL (ref 2.5–4.9)
PLATELET COUNT: 47 10^3/UL (ref 140–415)
POSITIVE DIFF: (no result)
POTASSIUM: 4.2 MMOL/L (ref 3.5–5.1)
RED BLOOD COUNT: 3.42 10^6/UL (ref 4.2–5.4)
RED CELL DISTRIBUTION WIDTH: 13.9 % (ref 11.5–14.5)
SODIUM: 139 MMOL/L (ref 135–144)
TOTAL PROTEIN: 5.2 G/DL (ref 6.1–8.1)
UR ASCORBIC ACID: 40 MG/DL
UR BILIRUBIN (DIP): (no result) MG/DL
UR BLOOD (DIP): NEGATIVE MG/DL
UR CLARITY: CLEAR
UR COLOR: (no result)
UR GLUCOSE (DIP): (no result) MG/DL
UR KETONES (DIP): NEGATIVE MG/DL
UR LEUKOCYTE ESTERASE (DIP): (no result) LEU/UL
UR NITRITE (DIP): NEGATIVE MG/DL
UR PH (DIP): 5 (ref 5–9)
UR RBC: 1 /HPF (ref 0–5)
UR SPECIFIC GRAVITY (DIP): 1.03 (ref 1–1.03)
UR SQUAMOUS EPITHELIAL CELL: (no result) /HPF
UR TOTAL PROTEIN (DIP): (no result) MG/DL
UR UROBILINOGEN (DIP): (no result) MG/DL
UR WBC: 23 /HPF (ref 0–5)
WHITE BLOOD COUNT: 2.6 10^3/UL (ref 4.8–10.8)

## 2018-08-10 RX ADMIN — FLUOXETINE 1 MG: 20 CAPSULE ORAL at 08:15

## 2018-08-10 RX ADMIN — THIAMINE HYDROCHLORIDE 1 MLS/HR: 100 INJECTION, SOLUTION INTRAMUSCULAR; INTRAVENOUS at 09:58

## 2018-08-10 RX ADMIN — PANTOPRAZOLE SODIUM 1 MG: 40 TABLET, DELAYED RELEASE ORAL at 05:37

## 2018-08-10 RX ADMIN — SODIUM CHLORIDE 1 MLS/HR: 900 INJECTION, SOLUTION INTRAVENOUS at 14:08

## 2018-08-10 RX ADMIN — PIPERACILLIN AND TAZOBACTAM 1 MLS/HR: 2; .25 INJECTION, POWDER, FOR SOLUTION INTRAVENOUS at 05:37

## 2018-08-10 RX ADMIN — PIPERACILLIN AND TAZOBACTAM 1 MLS/HR: 2; .25 INJECTION, POWDER, FOR SOLUTION INTRAVENOUS at 23:26

## 2018-08-10 RX ADMIN — THIAMINE HYDROCHLORIDE 1 MLS/HR: 100 INJECTION, SOLUTION INTRAMUSCULAR; INTRAVENOUS at 10:46

## 2018-08-10 RX ADMIN — PIPERACILLIN AND TAZOBACTAM 1 MLS/HR: 2; .25 INJECTION, POWDER, FOR SOLUTION INTRAVENOUS at 18:48

## 2018-08-10 RX ADMIN — THIAMINE HYDROCHLORIDE 1 MLS/HR: 100 INJECTION, SOLUTION INTRAMUSCULAR; INTRAVENOUS at 19:48

## 2018-08-10 RX ADMIN — THIAMINE HYDROCHLORIDE 1 MLS/HR: 100 INJECTION, SOLUTION INTRAMUSCULAR; INTRAVENOUS at 23:26

## 2018-08-11 LAB
ABNORMAL IP MESSAGE: 1
ADD MAN DIFF?: NO
ALANINE AMINOTRANSFERASE: 31 IU/L (ref 13–69)
ALANINE AMINOTRANSFERASE: 38 IU/L (ref 13–69)
ALBUMIN/GLOBULIN RATIO: 0.76
ALBUMIN/GLOBULIN RATIO: 0.77
ALBUMIN: 2.4 G/DL (ref 3.3–4.9)
ALBUMIN: 2.6 G/DL (ref 3.3–4.9)
ALKALINE PHOSPHATASE: 139 IU/L (ref 42–121)
ALKALINE PHOSPHATASE: 154 IU/L (ref 42–121)
ALPHA FETOPROTEIN: 7.91 IU/L (ref 0–7.21)
AMMONIA: 98 UMOL/L (ref 9–30)
ANION GAP: 11 (ref 8–16)
ANION GAP: 8 (ref 8–16)
ASPARTATE AMINO TRANSFERASE: 51 IU/L (ref 15–46)
ASPARTATE AMINO TRANSFERASE: 63 IU/L (ref 15–46)
BASOPHIL #: 0 10^3/UL (ref 0–0.1)
BASOPHILS %: 0.2 % (ref 0–2)
BILIRUBIN,DIRECT: 0 MG/DL (ref 0–0.2)
BILIRUBIN,DIRECT: 0 MG/DL (ref 0–0.2)
BILIRUBIN,TOTAL: 0.7 MG/DL (ref 0.2–1.3)
BILIRUBIN,TOTAL: 0.8 MG/DL (ref 0.2–1.3)
BLOOD UREA NITROGEN: 10 MG/DL (ref 7–20)
BLOOD UREA NITROGEN: 9 MG/DL (ref 7–20)
CALCIUM: 7.2 MG/DL (ref 8.4–10.2)
CALCIUM: 7.7 MG/DL (ref 8.4–10.2)
CANCER ANTIGEN 19-9: 28.1 U/ML (ref 0–37)
CARBON DIOXIDE: 21 MMOL/L (ref 21–31)
CARBON DIOXIDE: 21 MMOL/L (ref 21–31)
CARCINOEMBRYONIC ANTIGEN: 1.4 NG/ML (ref 0–5)
CHLORIDE: 115 MMOL/L (ref 97–110)
CHLORIDE: 117 MMOL/L (ref 97–110)
CREATININE: 0.49 MG/DL (ref 0.44–1)
CREATININE: 0.52 MG/DL (ref 0.44–1)
EOSINOPHILS #: 0 10^3/UL (ref 0–0.5)
EOSINOPHILS %: 0.2 % (ref 0–7)
GLOBULIN: 3.1 G/DL (ref 1.3–3.2)
GLOBULIN: 3.4 G/DL (ref 1.3–3.2)
GLUCOSE: 118 MG/DL (ref 70–220)
GLUCOSE: 120 MG/DL (ref 70–220)
HEMATOCRIT: 32.8 % (ref 37–47)
HEMOGLOBIN: 11.3 G/DL (ref 12–16)
IMMATURE GRANS #M: 0.03 10^3/UL
IMMATURE GRANS % (M): 0.6 %
INR: 1.52
LYMPHOCYTES #: 0.8 10^3/UL (ref 0.8–2.9)
LYMPHOCYTES %: 16.4 % (ref 15–51)
MAGNESIUM: 2.3 MG/DL (ref 1.7–2.5)
MEAN CORPUSCULAR HEMOGLOBIN: 33.8 PG (ref 29–33)
MEAN CORPUSCULAR HGB CONC: 34.5 G/DL (ref 32–37)
MEAN CORPUSCULAR VOLUME: 98.2 FL (ref 82–101)
MEAN PLATELET VOLUME: 11.7 FL (ref 7.4–10.4)
MONOCYTE #: 0.3 10^3/UL (ref 0.3–0.9)
MONOCYTES %: 6 % (ref 0–11)
NEUTROPHIL #: 3.7 10^3/UL (ref 1.6–7.5)
NEUTROPHILS %: 76.6 % (ref 39–77)
NUCLEATED RED BLOOD CELLS #: 0 10^3/UL (ref 0–0)
NUCLEATED RED BLOOD CELLS%: 0 /100WBC (ref 0–0)
PARTIAL THROMBOPLASTIN TIME: 38.6 SEC (ref 25–35)
PHOSPHORUS: 2.1 MG/DL (ref 2.5–4.9)
PLATELET COUNT: 72 10^3/UL (ref 140–415)
POSITIVE DIFF: (no result)
POTASSIUM: 3.8 MMOL/L (ref 3.5–5.1)
POTASSIUM: 4.3 MMOL/L (ref 3.5–5.1)
PROTIME: 18.6 SEC (ref 11.9–14.9)
PT RATIO: 1.5
RED BLOOD COUNT: 3.34 10^6/UL (ref 4.2–5.4)
RED CELL DISTRIBUTION WIDTH: 14.2 % (ref 11.5–14.5)
SODIUM: 142 MMOL/L (ref 135–144)
SODIUM: 143 MMOL/L (ref 135–144)
TOTAL PROTEIN: 5.5 G/DL (ref 6.1–8.1)
TOTAL PROTEIN: 6 G/DL (ref 6.1–8.1)
WHITE BLOOD COUNT: 4.9 10^3/UL (ref 4.8–10.8)

## 2018-08-11 RX ADMIN — PIPERACILLIN AND TAZOBACTAM 1 MLS/HR: 2; .25 INJECTION, POWDER, FOR SOLUTION INTRAVENOUS at 05:32

## 2018-08-11 RX ADMIN — THIAMINE HYDROCHLORIDE 1 MLS/HR: 100 INJECTION, SOLUTION INTRAMUSCULAR; INTRAVENOUS at 15:58

## 2018-08-11 RX ADMIN — PIPERACILLIN AND TAZOBACTAM 1 MLS/HR: 2; .25 INJECTION, POWDER, FOR SOLUTION INTRAVENOUS at 17:36

## 2018-08-11 RX ADMIN — PANTOPRAZOLE SODIUM 1 MG: 40 TABLET, DELAYED RELEASE ORAL at 05:32

## 2018-08-11 RX ADMIN — THIAMINE HYDROCHLORIDE 1 MLS/HR: 100 INJECTION, SOLUTION INTRAMUSCULAR; INTRAVENOUS at 10:38

## 2018-08-11 RX ADMIN — ZOLPIDEM TARTRATE 1 MG: 5 TABLET, FILM COATED ORAL at 01:39

## 2018-08-11 RX ADMIN — PIPERACILLIN AND TAZOBACTAM 1 MLS/HR: 2; .25 INJECTION, POWDER, FOR SOLUTION INTRAVENOUS at 13:21

## 2018-08-11 RX ADMIN — THIAMINE HYDROCHLORIDE 1 MLS/HR: 100 INJECTION, SOLUTION INTRAMUSCULAR; INTRAVENOUS at 05:27

## 2018-08-11 RX ADMIN — FLUOXETINE 1 MG: 20 CAPSULE ORAL at 08:14

## 2018-11-07 ENCOUNTER — HOSPITAL ENCOUNTER (EMERGENCY)
Dept: HOSPITAL 91 - E/R | Age: 58
LOS: 1 days | Discharge: HOME | End: 2018-11-08
Payer: MEDICAID

## 2018-11-07 ENCOUNTER — HOSPITAL ENCOUNTER (EMERGENCY)
Age: 58
LOS: 1 days | Discharge: HOME | End: 2018-11-08

## 2018-11-07 DIAGNOSIS — R11.2: ICD-10-CM

## 2018-11-07 DIAGNOSIS — R10.11: Primary | ICD-10-CM

## 2018-11-07 DIAGNOSIS — R19.7: ICD-10-CM

## 2018-11-07 PROCEDURE — 85730 THROMBOPLASTIN TIME PARTIAL: CPT

## 2018-11-07 PROCEDURE — 96374 THER/PROPH/DIAG INJ IV PUSH: CPT

## 2018-11-07 PROCEDURE — 83690 ASSAY OF LIPASE: CPT

## 2018-11-07 PROCEDURE — 85025 COMPLETE CBC W/AUTO DIFF WBC: CPT

## 2018-11-07 PROCEDURE — 96375 TX/PRO/DX INJ NEW DRUG ADDON: CPT

## 2018-11-07 PROCEDURE — 74176 CT ABD & PELVIS W/O CONTRAST: CPT

## 2018-11-07 PROCEDURE — 85610 PROTHROMBIN TIME: CPT

## 2018-11-07 PROCEDURE — 80053 COMPREHEN METABOLIC PANEL: CPT

## 2018-11-07 PROCEDURE — 99285 EMERGENCY DEPT VISIT HI MDM: CPT

## 2018-11-07 PROCEDURE — 36415 COLL VENOUS BLD VENIPUNCTURE: CPT

## 2018-11-08 LAB
ABNORMAL IP MESSAGE: 1
ADD MAN DIFF?: NO
ALANINE AMINOTRANSFERASE: 39 IU/L (ref 13–69)
ALBUMIN/GLOBULIN RATIO: 1.33
ALBUMIN: 4 G/DL (ref 3.3–4.9)
ALKALINE PHOSPHATASE: 176 IU/L (ref 42–121)
ANION GAP: 11 (ref 5–13)
ASPARTATE AMINO TRANSFERASE: 65 IU/L (ref 15–46)
BASOPHIL #: 0 10^3/UL (ref 0–0.1)
BASOPHILS %: 0.5 % (ref 0–2)
BILIRUBIN,DIRECT: 0 MG/DL (ref 0–0.2)
BILIRUBIN,TOTAL: 1.7 MG/DL (ref 0.2–1.3)
BLOOD UREA NITROGEN: 5 MG/DL (ref 7–20)
CALCIUM: 8.8 MG/DL (ref 8.4–10.2)
CARBON DIOXIDE: 25 MMOL/L (ref 21–31)
CHLORIDE: 106 MMOL/L (ref 97–110)
CREATININE: 0.52 MG/DL (ref 0.44–1)
EOSINOPHILS #: 0.1 10^3/UL (ref 0–0.5)
EOSINOPHILS %: 2.4 % (ref 0–7)
GLOBULIN: 3 G/DL (ref 1.3–3.2)
GLUCOSE: 105 MG/DL (ref 70–220)
HEMATOCRIT: 40 % (ref 37–47)
HEMOGLOBIN: 13.3 G/DL (ref 12–16)
IMMATURE GRANS #M: 0.01 10^3/UL (ref 0–0.03)
IMMATURE GRANS % (M): 0.2 % (ref 0–0.43)
INR: 1.35
LIPASE: 92 U/L (ref 23–300)
LYMPHOCYTES #: 1.6 10^3/UL (ref 0.8–2.9)
LYMPHOCYTES %: 29.9 % (ref 15–51)
MEAN CORPUSCULAR HEMOGLOBIN: 31.7 PG (ref 29–33)
MEAN CORPUSCULAR HGB CONC: 33.3 G/DL (ref 32–37)
MEAN CORPUSCULAR VOLUME: 95.2 FL (ref 82–101)
MEAN PLATELET VOLUME: 11.8 FL (ref 7.4–10.4)
MONOCYTE #: 0.5 10^3/UL (ref 0.3–0.9)
MONOCYTES %: 8.9 % (ref 0–11)
NEUTROPHIL #: 3.2 10^3/UL (ref 1.6–7.5)
NEUTROPHILS %: 58.1 % (ref 39–77)
NUCLEATED RED BLOOD CELLS #: 0 10^3/UL (ref 0–0)
NUCLEATED RED BLOOD CELLS%: 0 /100WBC (ref 0–0)
PARTIAL THROMBOPLASTIN TIME: 37.8 SEC (ref 23–35)
PLATELET COUNT: 72 10^3/UL (ref 140–415)
POSITIVE DIFF: (no result)
POTASSIUM: 4.1 MMOL/L (ref 3.5–5.1)
PROTIME: 16.9 SEC (ref 11.9–14.9)
PT RATIO: 1.3
RED BLOOD COUNT: 4.2 10^6/UL (ref 4.2–5.4)
RED CELL DISTRIBUTION WIDTH: 14.3 % (ref 11.5–14.5)
SODIUM: 142 MMOL/L (ref 135–144)
TOTAL PROTEIN: 7 G/DL (ref 6.1–8.1)
WHITE BLOOD COUNT: 5.5 10^3/UL (ref 4.8–10.8)

## 2018-11-08 RX ADMIN — THIAMINE HYDROCHLORIDE 1 MLS/HR: 100 INJECTION, SOLUTION INTRAMUSCULAR; INTRAVENOUS at 00:07

## 2018-11-08 RX ADMIN — ONDANSETRON HYDROCHLORIDE 1 MG: 2 INJECTION, SOLUTION INTRAMUSCULAR; INTRAVENOUS at 00:07

## 2019-06-05 ENCOUNTER — HOSPITAL ENCOUNTER (EMERGENCY)
Dept: HOSPITAL 91 - E/R | Age: 59
LOS: 1 days | Discharge: HOME | End: 2019-06-06
Payer: MEDICAID

## 2019-06-05 ENCOUNTER — HOSPITAL ENCOUNTER (EMERGENCY)
Dept: HOSPITAL 10 - E/R | Age: 59
LOS: 1 days | Discharge: HOME | End: 2019-06-06
Payer: MEDICAID

## 2019-06-05 VITALS
BODY MASS INDEX: 29.37 KG/M2 | HEIGHT: 62 IN | WEIGHT: 159.61 LBS | HEIGHT: 62 IN | BODY MASS INDEX: 29.37 KG/M2 | WEIGHT: 159.61 LBS

## 2019-06-05 DIAGNOSIS — R10.9: Primary | ICD-10-CM

## 2019-06-05 PROCEDURE — 99285 EMERGENCY DEPT VISIT HI MDM: CPT

## 2019-06-05 PROCEDURE — 36415 COLL VENOUS BLD VENIPUNCTURE: CPT

## 2019-06-05 PROCEDURE — 96374 THER/PROPH/DIAG INJ IV PUSH: CPT

## 2019-06-05 PROCEDURE — 96375 TX/PRO/DX INJ NEW DRUG ADDON: CPT

## 2019-06-05 PROCEDURE — 76705 ECHO EXAM OF ABDOMEN: CPT

## 2019-06-05 PROCEDURE — 83690 ASSAY OF LIPASE: CPT

## 2019-06-05 PROCEDURE — 96361 HYDRATE IV INFUSION ADD-ON: CPT

## 2019-06-05 PROCEDURE — 80053 COMPREHEN METABOLIC PANEL: CPT

## 2019-06-05 PROCEDURE — 85025 COMPLETE CBC W/AUTO DIFF WBC: CPT

## 2019-06-05 PROCEDURE — 74176 CT ABD & PELVIS W/O CONTRAST: CPT

## 2019-06-06 VITALS — DIASTOLIC BLOOD PRESSURE: 61 MMHG | HEART RATE: 64 BPM | SYSTOLIC BLOOD PRESSURE: 118 MMHG | RESPIRATION RATE: 18 BRPM

## 2019-06-06 LAB
ABNORMAL IP MESSAGE: 1
ADD MAN DIFF?: NO
ALANINE AMINOTRANSFERASE: 32 IU/L (ref 13–69)
ALBUMIN/GLOBULIN RATIO: 1.02
ALBUMIN: 3.8 G/DL (ref 3.3–4.9)
ALKALINE PHOSPHATASE: 220 IU/L (ref 42–121)
ANION GAP: 9 (ref 5–13)
ASPARTATE AMINO TRANSFERASE: 42 IU/L (ref 15–46)
BASOPHIL #: 0 10^3/UL (ref 0–0.1)
BASOPHILS %: 0.4 % (ref 0–2)
BILIRUBIN,DIRECT: 0 MG/DL (ref 0–0.2)
BILIRUBIN,TOTAL: 1.6 MG/DL (ref 0.2–1.3)
BLOOD UREA NITROGEN: 7 MG/DL (ref 7–20)
CALCIUM: 9.2 MG/DL (ref 8.4–10.2)
CARBON DIOXIDE: 25 MMOL/L (ref 21–31)
CHLORIDE: 108 MMOL/L (ref 97–110)
CREATININE: 0.52 MG/DL (ref 0.44–1)
EOSINOPHILS #: 0 10^3/UL (ref 0–0.5)
EOSINOPHILS %: 0.6 % (ref 0–7)
GLOBULIN: 3.7 G/DL (ref 1.3–3.2)
GLUCOSE: 139 MG/DL (ref 70–220)
HEMATOCRIT: 40 % (ref 37–47)
HEMOGLOBIN: 13.7 G/DL (ref 12–16)
IMMATURE GRANS #M: 0.02 10^3/UL (ref 0–0.03)
IMMATURE GRANS % (M): 0.4 % (ref 0–0.43)
LIPASE: 68 U/L (ref 23–300)
LYMPHOCYTES #: 0.9 10^3/UL (ref 0.8–2.9)
LYMPHOCYTES %: 16.5 % (ref 15–51)
MEAN CORPUSCULAR HEMOGLOBIN: 32.5 PG (ref 29–33)
MEAN CORPUSCULAR HGB CONC: 34.3 G/DL (ref 32–37)
MEAN CORPUSCULAR VOLUME: 94.8 FL (ref 82–101)
MEAN PLATELET VOLUME: 11 FL (ref 7.4–10.4)
MONOCYTE #: 0.3 10^3/UL (ref 0.3–0.9)
MONOCYTES %: 6.5 % (ref 0–11)
NEUTROPHIL #: 4 10^3/UL (ref 1.6–7.5)
NEUTROPHILS %: 75.6 % (ref 39–77)
NUCLEATED RED BLOOD CELLS #: 0 10^3/UL (ref 0–0)
NUCLEATED RED BLOOD CELLS%: 0 /100WBC (ref 0–0)
PLATELET COUNT: 70 10^3/UL (ref 140–415)
POSITIVE DIFF: (no result)
POTASSIUM: 3.9 MMOL/L (ref 3.5–5.1)
RED BLOOD COUNT: 4.22 10^6/UL (ref 4.2–5.4)
RED CELL DISTRIBUTION WIDTH: 13.6 % (ref 11.5–14.5)
SODIUM: 142 MMOL/L (ref 135–144)
TOTAL PROTEIN: 7.5 G/DL (ref 6.1–8.1)
WHITE BLOOD COUNT: 5.2 10^3/UL (ref 4.8–10.8)

## 2019-06-06 RX ADMIN — LIDOCAINE HYDROCHLORIDE 1 MLS/HR: 10 INJECTION, SOLUTION EPIDURAL; INFILTRATION; INTRACAUDAL; PERINEURAL at 02:38

## 2019-06-06 RX ADMIN — ONDANSETRON HYDROCHLORIDE 1 MG: 2 INJECTION, SOLUTION INTRAMUSCULAR; INTRAVENOUS at 02:38

## 2019-06-25 NOTE — ERD
ER Documentation


Chief Complaint


Chief Complaint





abd pain today; hx of liver cirrhosis





HPI


This is a 59-year female complains of abdominal pain.  Patient has history of 


liver cirrhosis.  She denies fevers chills nausea vomiting.  Pain is mild to 


moderate intensity with no exacerbating alleviating factors.





ROS


All systems reviewed and are negative except as per history of present illness.





Medications


Home Meds


Active Scripts


Dicyclomine HCl (Dicyclomine HCl) 10 Mg Capsule, 10 MG PO TID PRN for ABDOMINAL 


CRAMPING, #20 CAP


   Prov:JUAN MIGUEL MALLORY MD         11/8/18





Allergies


Allergies:  


Coded Allergies:  


     No Known Allergy (Unverified , 6/6/19)





PMhx/Soc


Anesthesia Reaction:  No


Hx Neurological Disorder:  No


Hx Respiratory Disorders:  No


Hx Cardiac Disorders:  No


Hx Psychiatric Problems:  Yes (DEPRESSION)


Hx Miscellaneous Medical Probl:  Yes (Liver alcoholic cirrhosis)


Hx Alcohol Use:  Yes (3 YEARS AGO)


Hx Substance Use:  No


Hx Tobacco Use:  No


Smoking Status:  Never smoker





Physical Exam


Physical Exam


Const:   No acute distress


Head:   Atraumatic 


Eyes:    Normal Conjunctiva


ENT:    Normal External Ears, Nose and Mouth.


Neck:               Full range of motion. No meningismus.


Resp:   Clear to auscultation bilaterally


Cardio:   Regular rate and rhythm, no murmurs


Abd:    Soft, non tender, non distended. Normal bowel sounds


Skin:   No petechiae or rashes


Back:   No midline or flank tenderness


Ext:    No cyanosis, or edema


Neur:   Awake and alert


Psych:    Normal Mood and Affect


Results 24 hrs





Laboratory Tests


              Test
                                  6/6/19
02:30


              White Blood Count                      5.2 10^3/ul


              Red Blood Count                       4.22 10^6/ul


              Hemoglobin                               13.7 g/dl


              Hematocrit                                  40.0 %


              Mean Corpuscular Volume                    94.8 fl


              Mean Corpuscular Hemoglobin                32.5 pg


              Mean Corpuscular Hemoglobin
Concent     34.3 g/dl 



              Red Cell Distribution Width                 13.6 %


              Platelet Count                          70 10^3/UL


              Mean Platelet Volume                       11.0 fl


              Immature Granulocytes %                    0.400 %


              Neutrophils %                               75.6 %


              Lymphocytes %                               16.5 %


              Monocytes %                                  6.5 %


              Eosinophils %                                0.6 %


              Basophils %                                  0.4 %


              Nucleated Red Blood Cells %            0.0 /100WBC


              Immature Granulocytes #              0.020 10^3/ul


              Neutrophils #                          4.0 10^3/ul


              Lymphocytes #                          0.9 10^3/ul


              Monocytes #                            0.3 10^3/ul


              Eosinophils #                          0.0 10^3/ul


              Basophils #                            0.0 10^3/ul


              Nucleated Red Blood Cells #            0.0 10^3/ul


              Sodium Level                            142 mmol/L


              Potassium Level                         3.9 mmol/L


              Chloride Level                          108 mmol/L


              Carbon Dioxide Level                     25 mmol/L


              Anion Gap                                        9


              Blood Urea Nitrogen                        7 mg/dl


              Creatinine                              0.52 mg/dl


              Est Glomerular Filtrat Rate
mL/min   > 60 mL/min 



              Glucose Level                            139 mg/dl


              Calcium Level                            9.2 mg/dl


              Total Bilirubin                          1.6 mg/dl


              Direct Bilirubin                        0.00 mg/dl


              Indirect Bilirubin                       1.6 mg/dl


              Aspartate Amino Transf
(AST/SGOT)         42 IU/L 



              Alanine Aminotransferase
(ALT/SGPT)       32 IU/L 



              Alkaline Phosphatase                      220 IU/L


              Total Protein                             7.5 g/dl


              Albumin                                   3.8 g/dl


              Globulin                                 3.70 g/dl


              Albumin/Globulin Ratio                        1.02


              Lipase                                      68 U/L





Current Medications


 Medications
   Dose
          Sig/Emily
       Start Time
   Status  Last


 (Trade)       Ordered        Route
 PRN     Stop Time              Admin
Dose


                              Reason                                Admin


 Sodium         500 ml @ 
     Q1H ONCE
      6/6/19        DC            6/6/19


Chloride       500 mls/hr     IV
            02:29
 6/6/19                02:38



                                             03:28


 Morphine       4 mg           ONCE  ONCE
    6/6/19        DC            6/6/19


Sulfate
                      IV
            02:29
 6/6/19                02:38



(morphine)                                   02:30


 Ondansetron    4 mg           ONCE  ONCE
    6/6/19        DC            6/6/19


HCl
  (Zofran                 IV
            02:29
 6/6/19                02:38



Inj)                                         02:30








Procedures/MDM


Patient's gastrointestinal symptoms have stabilized while in the department.


No evidence of severe dehydration, sepsis, or surgical abdomen.


Extensive discussion with family and patient that occult disease cannot be ruled


out.


   8 hour recheck for repeat abdominal exam is planned.





Departure


Diagnosis:  


   Primary Impression:  


   Abdominal pain


   Abdominal location:  unspecified location  Qualified Codes:  R10.9 - 


   Unspecified abdominal pain


Condition:  Fair


Patient Instructions:  Abdominal Pain, Unknown Cause, (Female)











NEREIDA VELEZ             Jun 25, 2019 02:45